# Patient Record
Sex: MALE | Race: WHITE | NOT HISPANIC OR LATINO | Employment: FULL TIME | URBAN - NONMETROPOLITAN AREA
[De-identification: names, ages, dates, MRNs, and addresses within clinical notes are randomized per-mention and may not be internally consistent; named-entity substitution may affect disease eponyms.]

---

## 2017-04-11 ENCOUNTER — OFFICE VISIT (OUTPATIENT)
Dept: FAMILY MEDICINE CLINIC | Facility: CLINIC | Age: 32
End: 2017-04-11

## 2017-04-11 VITALS
BODY MASS INDEX: 33.12 KG/M2 | SYSTOLIC BLOOD PRESSURE: 110 MMHG | HEIGHT: 67 IN | HEART RATE: 60 BPM | WEIGHT: 211 LBS | DIASTOLIC BLOOD PRESSURE: 80 MMHG | TEMPERATURE: 98.7 F

## 2017-04-11 DIAGNOSIS — M54.16 LEFT LUMBAR RADICULOPATHY: Primary | ICD-10-CM

## 2017-04-11 DIAGNOSIS — S39.012A LUMBAR STRAIN, INITIAL ENCOUNTER: ICD-10-CM

## 2017-04-11 DIAGNOSIS — M62.830 MUSCLE SPASM OF BACK: ICD-10-CM

## 2017-04-11 PROBLEM — S39.92XA INJURY OF BACK: Status: ACTIVE | Noted: 2017-03-01

## 2017-04-11 PROCEDURE — 99214 OFFICE O/P EST MOD 30 MIN: CPT | Performed by: INTERNAL MEDICINE

## 2017-04-11 RX ORDER — MELOXICAM 15 MG/1
15 TABLET ORAL DAILY PRN
Qty: 30 TABLET | Refills: 0 | Status: SHIPPED | OUTPATIENT
Start: 2017-04-11 | End: 2017-05-08 | Stop reason: SDUPTHER

## 2017-04-11 RX ORDER — TIZANIDINE 4 MG/1
TABLET ORAL
Qty: 30 TABLET | Refills: 0 | Status: SHIPPED | OUTPATIENT
Start: 2017-04-11 | End: 2017-05-08 | Stop reason: SDUPTHER
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
## 2017-04-12 ENCOUNTER — DOCUMENTATION (OUTPATIENT)
Dept: FAMILY MEDICINE CLINIC | Facility: CLINIC | Age: 32
End: 2017-04-12

## 2017-04-12 ENCOUNTER — TELEPHONE (OUTPATIENT)
Dept: FAMILY MEDICINE CLINIC | Facility: CLINIC | Age: 32
End: 2017-04-12

## 2017-04-12 DIAGNOSIS — S39.92XD INJURY OF BACK, SUBSEQUENT ENCOUNTER: Primary | ICD-10-CM

## 2017-04-12 NOTE — TELEPHONE ENCOUNTER
Called patient and instructed to come in for lumbar spine film. He didn't have one at the emergency room

## 2017-05-08 ENCOUNTER — OFFICE VISIT (OUTPATIENT)
Dept: FAMILY MEDICINE CLINIC | Facility: CLINIC | Age: 32
End: 2017-05-08

## 2017-05-08 VITALS
TEMPERATURE: 98.8 F | HEIGHT: 67 IN | SYSTOLIC BLOOD PRESSURE: 110 MMHG | BODY MASS INDEX: 32.9 KG/M2 | WEIGHT: 209.6 LBS | HEART RATE: 60 BPM | DIASTOLIC BLOOD PRESSURE: 70 MMHG

## 2017-05-08 DIAGNOSIS — M62.830 MUSCLE SPASM OF BACK: ICD-10-CM

## 2017-05-08 DIAGNOSIS — S39.012A LUMBAR STRAIN, INITIAL ENCOUNTER: ICD-10-CM

## 2017-05-08 DIAGNOSIS — M54.16 LEFT LUMBAR RADICULOPATHY: Primary | ICD-10-CM

## 2017-05-08 PROCEDURE — 99214 OFFICE O/P EST MOD 30 MIN: CPT | Performed by: INTERNAL MEDICINE

## 2017-05-08 RX ORDER — MELOXICAM 15 MG/1
15 TABLET ORAL DAILY PRN
Qty: 30 TABLET | Refills: 0 | Status: SHIPPED | OUTPATIENT
Start: 2017-05-08 | End: 2022-01-17

## 2017-05-08 RX ORDER — TIZANIDINE 4 MG/1
TABLET ORAL
Qty: 30 TABLET | Refills: 0 | Status: SHIPPED | OUTPATIENT
Start: 2017-05-08 | End: 2022-01-17

## 2017-05-24 ENCOUNTER — CONSULT (OUTPATIENT)
Dept: PHYSICAL THERAPY | Facility: CLINIC | Age: 32
End: 2017-05-24

## 2017-05-24 ENCOUNTER — OFFICE VISIT (OUTPATIENT)
Dept: FAMILY MEDICINE CLINIC | Facility: CLINIC | Age: 32
End: 2017-05-24

## 2017-05-24 VITALS
HEIGHT: 67 IN | HEART RATE: 68 BPM | TEMPERATURE: 98.3 F | DIASTOLIC BLOOD PRESSURE: 88 MMHG | BODY MASS INDEX: 33.18 KG/M2 | WEIGHT: 211.4 LBS | SYSTOLIC BLOOD PRESSURE: 136 MMHG

## 2017-05-24 DIAGNOSIS — M62.830 MUSCLE SPASM OF BACK: ICD-10-CM

## 2017-05-24 DIAGNOSIS — M62.830 LUMBAR PARASPINAL MUSCLE SPASM: Primary | ICD-10-CM

## 2017-05-24 DIAGNOSIS — M47.816 SPONDYLOSIS OF LUMBAR REGION WITHOUT MYELOPATHY OR RADICULOPATHY: ICD-10-CM

## 2017-05-24 DIAGNOSIS — S39.92XD INJURY OF BACK, SUBSEQUENT ENCOUNTER: Primary | ICD-10-CM

## 2017-05-24 DIAGNOSIS — S39.92XD INJURY TO BACK, SUBSEQUENT ENCOUNTER: ICD-10-CM

## 2017-05-24 PROCEDURE — 99214 OFFICE O/P EST MOD 30 MIN: CPT | Performed by: INTERNAL MEDICINE

## 2017-05-26 ENCOUNTER — TREATMENT (OUTPATIENT)
Dept: PHYSICAL THERAPY | Facility: CLINIC | Age: 32
End: 2017-05-26

## 2017-05-26 DIAGNOSIS — S39.92XD INJURY TO BACK, SUBSEQUENT ENCOUNTER: ICD-10-CM

## 2017-05-26 DIAGNOSIS — M62.830 LUMBAR PARASPINAL MUSCLE SPASM: Primary | ICD-10-CM

## 2017-05-26 DIAGNOSIS — M47.816 SPONDYLOSIS OF LUMBAR REGION WITHOUT MYELOPATHY OR RADICULOPATHY: ICD-10-CM

## 2017-05-26 PROCEDURE — 97110 THERAPEUTIC EXERCISES: CPT | Performed by: PHYSICAL THERAPIST

## 2017-05-30 ENCOUNTER — TREATMENT (OUTPATIENT)
Dept: PHYSICAL THERAPY | Facility: CLINIC | Age: 32
End: 2017-05-30

## 2017-05-30 DIAGNOSIS — S39.92XD INJURY TO BACK, SUBSEQUENT ENCOUNTER: ICD-10-CM

## 2017-05-30 DIAGNOSIS — M62.830 LUMBAR PARASPINAL MUSCLE SPASM: Primary | ICD-10-CM

## 2017-05-30 DIAGNOSIS — M47.816 SPONDYLOSIS OF LUMBAR REGION WITHOUT MYELOPATHY OR RADICULOPATHY: ICD-10-CM

## 2017-05-30 PROCEDURE — 97110 THERAPEUTIC EXERCISES: CPT | Performed by: PHYSICAL THERAPIST

## 2017-05-31 ENCOUNTER — TREATMENT (OUTPATIENT)
Dept: PHYSICAL THERAPY | Facility: CLINIC | Age: 32
End: 2017-05-31

## 2017-05-31 DIAGNOSIS — S39.92XD INJURY TO BACK, SUBSEQUENT ENCOUNTER: ICD-10-CM

## 2017-05-31 DIAGNOSIS — M62.830 LUMBAR PARASPINAL MUSCLE SPASM: Primary | ICD-10-CM

## 2017-05-31 DIAGNOSIS — M47.816 SPONDYLOSIS OF LUMBAR REGION WITHOUT MYELOPATHY OR RADICULOPATHY: ICD-10-CM

## 2017-05-31 PROCEDURE — 97110 THERAPEUTIC EXERCISES: CPT | Performed by: PHYSICAL THERAPIST

## 2017-06-07 ENCOUNTER — TREATMENT (OUTPATIENT)
Dept: PHYSICAL THERAPY | Facility: CLINIC | Age: 32
End: 2017-06-07

## 2017-06-07 DIAGNOSIS — S39.92XD INJURY TO BACK, SUBSEQUENT ENCOUNTER: ICD-10-CM

## 2017-06-07 DIAGNOSIS — M62.830 LUMBAR PARASPINAL MUSCLE SPASM: Primary | ICD-10-CM

## 2017-06-07 DIAGNOSIS — M47.816 SPONDYLOSIS OF LUMBAR REGION WITHOUT MYELOPATHY OR RADICULOPATHY: ICD-10-CM

## 2017-06-07 PROCEDURE — 97110 THERAPEUTIC EXERCISES: CPT | Performed by: PHYSICAL THERAPIST

## 2017-06-07 PROCEDURE — 97014 ELECTRIC STIMULATION THERAPY: CPT | Performed by: PHYSICAL THERAPIST

## 2017-06-07 NOTE — PROGRESS NOTES
"Daily Treatment Note    Time In: 11:00      Time Out: 11:55    ICD-10-CM ICD-9-CM   1. Lumbar paraspinal muscle spasm M62.830 724.8   2. Injury to back, subsequent encounter S39.92XD V58.89     959.19   3. Spondylosis of lumbar region without myelopathy or radiculopathy M47.816 721.3       Subjective   Pt reports cont mod L LBP. He has had some gradual improvement. He is doing HEP.   Patient reports to therapy 4-5/10 pain, and  25% improvement.  Attendance  5/6 visits.       Objective     V cues necessary for correct TE performance. Mild TTP at L QL area. Post treatment pain 2/10.       PROCEDURES AND MODALITIES:     Ice  Ice Applied: Yes  Location: [facility]  Rx Minutes: 15 mins  Ice S/P Rx: Yes    Electrical Stimulation  Stimulation Type: IFC  Location/Electrode Placement/Other: LB  Rx Minutes: 15 mins    EXERCISE  Exercise 1  Exercise Name 1: Bike  Time: 10 min  Exercise 2  Exercise Name 2: STM  Sets/Reps 2: 3' Exercise 3  Exercise Name 3: S/L QL stretch  Sets/Reps 3: 2' Exercise 4  Exercise Name 4: B lat hang stretch  Sets/Reps 4: 3/15\" Exercise 5  Exercise Name 5: L SKC  Sets/Reps 5: 1' Exercise 6  Exercise Name 6: H/L alt UE/LE iso  Sets/Reps 6: 2/10x   Exercise 7  Exercise Name 7: Lat pulldown  Equipment/Resistance 7: 45#  Sets/Reps 7: 25x Exercise 8  Exercise Name 8: Core press  Equipment/Resistance 8: small SB  Sets/Reps 8: 10x ea side                                         Therapy Exercise 48844 40 minutes and Other Procedure CPT 15 minutes Electrical Stimulation    Total Treatment Time: 55 Minutes    Assessment/Plan   Good tolerance of today's treatment. Gradual progress thus far. Pt continues to benefit from PT for further progression towards goals.  Progress per Plan of Care             Jordon Kaba, PTA  Physical Therapist    "

## 2017-06-19 ENCOUNTER — TREATMENT (OUTPATIENT)
Dept: PHYSICAL THERAPY | Facility: CLINIC | Age: 32
End: 2017-06-19

## 2017-06-19 DIAGNOSIS — S39.92XD INJURY TO BACK, SUBSEQUENT ENCOUNTER: ICD-10-CM

## 2017-06-19 DIAGNOSIS — M47.816 SPONDYLOSIS OF LUMBAR REGION WITHOUT MYELOPATHY OR RADICULOPATHY: ICD-10-CM

## 2017-06-19 DIAGNOSIS — M62.830 LUMBAR PARASPINAL MUSCLE SPASM: Primary | ICD-10-CM

## 2017-06-19 PROCEDURE — 97014 ELECTRIC STIMULATION THERAPY: CPT | Performed by: PHYSICAL THERAPIST

## 2017-06-19 PROCEDURE — 97110 THERAPEUTIC EXERCISES: CPT | Performed by: PHYSICAL THERAPIST

## 2017-06-19 NOTE — PROGRESS NOTES
"Daily Treatment Note    Time In: 8:45      Time Out: 9:50    ICD-10-CM ICD-9-CM   1. Lumbar paraspinal muscle spasm M62.830 724.8   2. Injury to back, subsequent encounter S39.92XD V58.89     959.19   3. Spondylosis of lumbar region without myelopathy or radiculopathy M47.816 721.3       Subjective   Pt has been on vacation with no big pain increases reported. Min LBP presently.   Patient reports to therapy 1/10 pain, and  25% improvement.  Attendance  6/7 visits. Recert next treatment.       Objective     V cues necessary for correct TE performance. No increased pain post treatment.       PROCEDURES AND MODALITIES:     Ice  Ice Applied: Yes  Location: LB  Rx Minutes: 15 mins  Ice S/P Rx: Yes    Electrical Stimulation  Stimulation Type: IFC  Max mAmp: 15  Location/Electrode Placement/Other: LB  Rx Minutes: 15 mins       EXERCISE  Exercise 1  Exercise Name 1: Bike  Time: 10 min  Exercise 2  Exercise Name 2: DKC with PB  Sets/Reps 2: 2/10x Exercise 3  Exercise Name 3: B lat hang stretch  Sets/Reps 3: 3/15\" Exercise 4  Exercise Name 4: Bridges with abd  Equipment/Resistance 4: black tb  Sets/Reps 4: 3/10x Exercise 5  Exercise Name 5: 1/2 H/L ab curl  Sets/Reps 5: 10x,  Exercise 6  Exercise Name 6: Plank  Sets/Reps 6: 45\", 35\"   Exercise 7  Exercise Name 7: S/L plank  Sets/Reps 7: 2/20\" Exercise 8  Exercise Name 8: Bent over alt LE lift  Sets/Reps 8: 2/10x                                         Therapy Exercise [id number] 50 minutes and Other Procedure CPT 15 minutes Electrical Stimulation    Total Treatment Time: 65 Minutes    Assessment/Plan   Good tolerance of today's treatment. Good demo of there ex. Pt continues to benefit from PT for further progression towards goals.  Progress per Plan of Care             Jordon Kaba, PTA  Physical Therapist    "

## 2017-06-21 ENCOUNTER — TREATMENT (OUTPATIENT)
Dept: PHYSICAL THERAPY | Facility: CLINIC | Age: 32
End: 2017-06-21

## 2017-06-21 DIAGNOSIS — M62.830 LUMBAR PARASPINAL MUSCLE SPASM: Primary | ICD-10-CM

## 2017-06-21 DIAGNOSIS — M47.816 SPONDYLOSIS OF LUMBAR REGION WITHOUT MYELOPATHY OR RADICULOPATHY: ICD-10-CM

## 2017-06-21 DIAGNOSIS — S39.92XD INJURY TO BACK, SUBSEQUENT ENCOUNTER: ICD-10-CM

## 2017-06-21 PROCEDURE — 97110 THERAPEUTIC EXERCISES: CPT | Performed by: PHYSICAL THERAPIST

## 2017-06-21 NOTE — PROGRESS NOTES
Physical Therapy Discharge Summary    Diagnosis/ICD-10 Code:  Lumbar paraspinal muscle spasm [M62.830]  Referring practitioner: Benjamin Lovelace MD  Date of Initial Visit: 6/21/2017  Patient seen for 7/8 sessions  Return to MD: GUERA    Time in: 0849 Time out: 0928 Total time: 39 minutes    Subjective:   Ignacio Adames states: pt states 85% improvement with the low back since starting PT, c/o 1/10 LBP currently, was 4/10 this morning as pt conceded to being sore from softball game last night. Pt reports doing HEP 2x/day.      Objective:   Current condition: Good  Test measurement: Modified Oswestry score 8/50 (was 10/50 at the evaluation), lumbar spine AROM WNL-all planes, MMT: left hip abd 5/5      Assessment:   Summary of Treatment:     EXERCISE  Exercise 1  Exercise Name 1: Bike  Time: 10 min  Exercise 2  Exercise Name 2: bridge w/ SLR  Sets/Reps 2: 1/10 Exercise 3  Exercise Name 3: quadruped opposite arm/leg lift  Sets/Reps 3: 1/10 Exercise 4  Exercise Name 4: seated on ball resisted trunk rotation  Equipment/Resistance 4: 30 lbs  Sets/Reps 4: 2/10 each  Exercise 5  Exercise Name 5: ball bridge with DKC  Sets/Reps 5: 2/10       Progress toward previous goals: all goal met, able to resume golf and softball without restrictions.        Plan:   Goals    1. Pt I with HEP  2. Improve L hip abd MMT to 5/5  3. Reduce left sided LBP to 2/10 level with prolonged standing > 30 hr.   4. Improve sitting tolerance to > 1 hr without increase in LBP with good posture.  5. Improve lumbar spine AROM to normal limits-all planes.  6. Able to resume playing golf without restrictions.    Timeframe: N/A-pt discharge today  Prognosis to achieve goals: N/A    Plan  Treatment plan with rationale: pt discharged due to goals met  Recommendations: f/u with completing HEP as instructed to promote/maintain core trunk stability    PT Signature: Joshua Orellana, PT      Based upon review of the patient's progress and therapy plan, it is my medical  opinion that Ignacio Adames should discharge from physical therapy treatment at [facility]  [address] [place] [address] Dr Ileana SAMS [id number] [phone].    Signature:  Benjamin Lovelace MD

## 2017-09-06 ENCOUNTER — OFFICE VISIT (OUTPATIENT)
Dept: FAMILY MEDICINE CLINIC | Facility: CLINIC | Age: 32
End: 2017-09-06

## 2017-09-06 VITALS
HEART RATE: 80 BPM | HEIGHT: 67 IN | TEMPERATURE: 97.9 F | WEIGHT: 220 LBS | SYSTOLIC BLOOD PRESSURE: 126 MMHG | DIASTOLIC BLOOD PRESSURE: 80 MMHG | BODY MASS INDEX: 34.53 KG/M2

## 2017-09-06 DIAGNOSIS — A77.40 EHRLICHIOSIS: ICD-10-CM
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
## 2017-09-06 PROCEDURE — 99213 OFFICE O/P EST LOW 20 MIN: CPT | Performed by: INTERNAL MEDICINE

## 2017-09-06 RX ORDER — DOXYCYCLINE HYCLATE 100 MG/1
100 CAPSULE ORAL 2 TIMES DAILY
COMMUNITY
End: 2022-01-17

## 2017-09-06 NOTE — PROGRESS NOTES
"Subjective     Ignacio Adames is a 32 y.o. male.     History of Present Illness   Ignacio was exposed to several tick bites approximately 2 weeks ago.  He found one tick embedded between 2 of his toes at that time.  Approximately 7 days later, he developed fever and headache and flulike aches and pains.  He went to a local walk-in clinic at the hospital and was treated with doxycycline for 10 days.  He still has 4- 5 days of doxycycline remaining.  His symptoms have resolved.  Testing for [place] spotted fever was negative.  Lyme disease testing was only positive in 1 of 10 bands.  Unfortunately, it appears that they did not test for early ketosis.  I reviewed the report and labs.  No CMP or CBC was performed to check for classic changes that would accompany tickborne infection such as early ketosis.  I have informed the patient that it is my opinion that he was experiencing early symptoms of early ketosis.  No symptoms appear to be successfully treated with doxycycline.  He is aware of the common side effects of doxycycline and denies any intolerances.  He never developed any rash.  All of the flulike symptoms have resolved.  Review of Systems   Constitutional: Negative for chills, fatigue and fever.   HENT: Negative for congestion, ear pain, postnasal drip, sinus pressure and sore throat.    Respiratory: Negative for cough, shortness of breath and wheezing.    Cardiovascular: Negative for chest pain, palpitations and leg swelling.   Gastrointestinal: Negative for abdominal pain, blood in stool, constipation, diarrhea, nausea and vomiting.   Endocrine: Negative for cold intolerance, heat intolerance, polydipsia and polyuria.   Genitourinary: Negative for dysuria, frequency, hematuria and urgency.   Skin: Negative for rash.   Neurological: Negative for syncope and weakness.       Objective     /80  Pulse 80  Temp 97.9 °F (36.6 °C) (Oral)   Ht 67\" (170.2 cm)  Wt 220 lb (99.8 kg)  BMI 34.46 " kg/m2    Physical Exam   Constitutional: He is oriented to person, place, and time. He appears well-developed and well-nourished. No distress.   Pleasant, intelligent.  No acute distress.   HENT:   Head: Normocephalic and atraumatic.   Nose: Nose normal.   Mouth/Throat: Oropharynx is clear and moist. No oropharyngeal exudate.   Eyes: EOM are normal. Pupils are equal, round, and reactive to light.   Neck: Neck supple. No JVD present. No thyromegaly present.   Cardiovascular: Normal rate, regular rhythm and normal heart sounds.    Pulmonary/Chest: Effort normal and breath sounds normal. No accessory muscle usage. No respiratory distress. He has no wheezes. He has no rales.   Abdominal: Soft. Bowel sounds are normal. He exhibits no distension. There is no tenderness.   Musculoskeletal: He exhibits no edema.   Lymphadenopathy:     He has no cervical adenopathy.   Neurological: He is alert and oriented to person, place, and time. No cranial nerve deficit.   Skin: No rash noted.   Dark complected skin.  No evidence of rash.   Psychiatric: He has a normal mood and affect. His speech is normal and behavior is normal.       PHQ-2/PHQ-9 Depression Screening 9/6/2017   Little interest or pleasure in doing things 0   Feeling down, depressed, or hopeless 0   Trouble falling or staying asleep, or sleeping too much -   Feeling tired or having little energy -   Poor appetite or overeating -   Feeling bad about yourself - or that you are a failure or have let yourself or your family down -   Trouble concentrating on things, such as reading the newspaper or watching television -   Moving or speaking so slowly that other people could have noticed. Or the opposite - being so fidgety or restless that you have been moving around a lot more than usual -   Thoughts that you would be better off dead, or of hurting yourself in some way -   Total Score 0   If you checked off any problems, how difficult have these problems made it for you to  do your work, take care of things at home, or get along with other people? -       Assessment/Plan   Complete the course of doxycycline.  Notify me of any recurrence of symptoms.  Pursue measures to decrease tick bite exposures in the future.  Diagnoses and all orders for this visit:    Tick bite, initial encounter    Ehrlichiosis    Other orders  -     doxycycline (VIBRAMYCIN) 100 MG capsule; Take 100 mg by mouth 2 (Two) Times a Day.        No results found for any previous visit.]

## 2022-01-17 PROCEDURE — U0003 INFECTIOUS AGENT DETECTION BY NUCLEIC ACID (DNA OR RNA); SEVERE ACUTE RESPIRATORY SYNDROME CORONAVIRUS 2 (SARS-COV-2) (CORONAVIRUS DISEASE [COVID-19]), AMPLIFIED PROBE TECHNIQUE, MAKING USE OF HIGH THROUGHPUT TECHNOLOGIES AS DESCRIBED BY CMS-2020-01-R: HCPCS | Performed by: PHYSICIAN ASSISTANT

## 2022-01-18 ENCOUNTER — LAB (OUTPATIENT)
Dept: LAB | Facility: OTHER | Age: 37
End: 2022-01-18